# Patient Record
Sex: MALE | Race: WHITE | NOT HISPANIC OR LATINO | Employment: FULL TIME | URBAN - NONMETROPOLITAN AREA
[De-identification: names, ages, dates, MRNs, and addresses within clinical notes are randomized per-mention and may not be internally consistent; named-entity substitution may affect disease eponyms.]

---
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
## 2022-01-07 PROCEDURE — U0004 COV-19 TEST NON-CDC HGH THRU: HCPCS | Performed by: NURSE PRACTITIONER

## 2022-08-17 ENCOUNTER — TRANSCRIBE ORDERS (OUTPATIENT)
Dept: ULTRASOUND IMAGING | Facility: HOSPITAL | Age: 22
End: 2022-08-17

## 2022-08-17 ENCOUNTER — HOSPITAL ENCOUNTER (OUTPATIENT)
Dept: ULTRASOUND IMAGING | Facility: HOSPITAL | Age: 22
Discharge: HOME | End: 2022-08-17

## 2022-08-17 DIAGNOSIS — N50.819 PAIN IN TESTICLE, UNSPECIFIED LATERALITY: ICD-10-CM

## 2022-08-17 DIAGNOSIS — N50.819 PAIN IN TESTICLE, UNSPECIFIED LATERALITY: Primary | ICD-10-CM

## 2022-08-17 DIAGNOSIS — R10.9 ACUTE ABDOMINAL PAIN: ICD-10-CM

## 2022-08-17 PROCEDURE — 76882 US LMTD JT/FCL EVL NVASC XTR: CPT

## 2022-08-17 PROCEDURE — 76870 US EXAM SCROTUM: CPT

## 2022-11-09 ENCOUNTER — OFFICE VISIT (OUTPATIENT)
Dept: CARDIOLOGY | Facility: CLINIC | Age: 22
End: 2022-11-09

## 2022-11-09 ENCOUNTER — PATIENT ROUNDING (BHMG ONLY) (OUTPATIENT)
Dept: CARDIOLOGY | Facility: CLINIC | Age: 22
End: 2022-11-09

## 2022-11-09 VITALS
SYSTOLIC BLOOD PRESSURE: 142 MMHG | HEART RATE: 78 BPM | DIASTOLIC BLOOD PRESSURE: 96 MMHG | BODY MASS INDEX: 28.71 KG/M2 | OXYGEN SATURATION: 99 % | RESPIRATION RATE: 18 BRPM | WEIGHT: 212 LBS | HEIGHT: 72 IN

## 2022-11-09 DIAGNOSIS — R07.89 CHEST PAIN, ATYPICAL: Primary | ICD-10-CM

## 2022-11-09 PROCEDURE — 93000 ELECTROCARDIOGRAM COMPLETE: CPT | Performed by: INTERNAL MEDICINE

## 2022-11-09 PROCEDURE — 99203 OFFICE O/P NEW LOW 30 MIN: CPT | Performed by: INTERNAL MEDICINE

## 2022-11-09 RX ORDER — DOXYCYCLINE 100 MG/1
100 CAPSULE ORAL 2 TIMES DAILY
COMMUNITY
Start: 2022-08-18

## 2022-11-09 NOTE — PROGRESS NOTES
[facility] Cardiology welcome email and rounding message has been sent to patient via [employer].

## 2022-11-09 NOTE — PROGRESS NOTES
"     [facility] Cardiology OP Consult Note    Paulo Amador  [id number]  2022    Referred By: No ref. provider found    Chief Complaint: Abnormal ECG and chest pain    History of Present Illness:   Mr. Paulo Amador is a 22 y.o. male who presents to the Cardiology Clinic for evaluation of an abnormal ECG and chest pain.  The patient does not have any significant past medical history.  He does not have any significant past cardiac history, though saw cardiology as a teenager for evaluation of an abnormal ECG and reportedly underwent unremarkable outpatient cardiac monitoring at that time.  He presents the cardiology clinic today for evaluation of an abnormal ECG.  The patient reports that upon recent follow-up with an urgent care center, an ECG was obtained and was reportedly abnormal.  It was subsequently suggested he follow-up with cardiology.  Today, the patient reports rare episodes of chest pain, typically with a \"sharp\" sensation.  The episodes occur randomly, and have no association with exertion.  The episodes typically resolve quickly.  He denies any associated nausea, vomiting, or diaphoresis.  No significant exertional dyspnea.  No palpitations.  He denies any history of presyncopal or syncopal events.  No other specific complaints today.    Past Cardiac Testin. Last Coronary Angio: None  2. Prior Stress Testing: None  3. Last Echo: None  4. Prior Holter Monitor: Remote, records unavailable    Review of Systems:   Review of Systems   Constitutional: Negative for activity change, appetite change, chills, diaphoresis, fatigue, fever, unexpected weight gain and unexpected weight loss.   Eyes: Negative for blurred vision and double vision.   Respiratory: Negative for cough, chest tightness, shortness of breath and wheezing.    Cardiovascular: Positive for chest pain. Negative for palpitations and leg swelling.   Gastrointestinal: Negative for abdominal pain, anal bleeding, " "blood in stool and GERD.   Endocrine: Negative for cold intolerance and heat intolerance.   Genitourinary: Negative for hematuria.   Neurological: Negative for dizziness, syncope, weakness and light-headedness.   Hematological: Does not bruise/bleed easily.   Psychiatric/Behavioral: Negative for depressed mood and stress. The patient is not nervous/anxious.        Past Medical History:   Past Medical History:   Diagnosis Date   • Abnormal ECG        Past Surgical History:   Past Surgical History:   Procedure Laterality Date   • ADENOIDECTOMY     • TONSILLECTOMY         Family History:   Family History   Problem Relation Age of Onset   • Hypertension Mother    • No Known Problems Father    • Diabetes Maternal Grandmother    • Diabetes Maternal Grandfather        Social History:   Social History     Socioeconomic History   • Marital status: Single   Tobacco Use   • Smoking status: Never   • Smokeless tobacco: Never   Vaping Use   • Vaping Use: Never used   Substance and Sexual Activity   • Alcohol use: No   • Drug use: No   • Sexual activity: Defer       Medications:     Current Outpatient Medications:   •  doxycycline (MONODOX) 100 MG capsule, Take 1 capsule by mouth 2 (Two) Times a Day., Disp: , Rfl:     Allergies:   No Known Allergies    Physical Exam:  Vital Signs:   Vitals:    11/09/22 1348   BP: 142/96   BP Location: Left arm   Patient Position: Lying   Pulse: 78   Resp: 18   SpO2: 99%   Weight: 96.2 kg (212 lb)   Height: 182.9 cm (72\")       Physical Exam  Constitutional:       General: He is not in acute distress.     Appearance: Normal appearance. He is not diaphoretic.   HENT:      Head: Normocephalic and atraumatic.   Cardiovascular:      Rate and Rhythm: Normal rate and regular rhythm.      Heart sounds: No murmur heard.  Pulmonary:      Effort: Pulmonary effort is normal. No respiratory distress.      Breath sounds: Normal breath sounds. No stridor. No wheezing, rhonchi or rales.   Abdominal:      " General: Bowel sounds are normal. There is no distension.      Palpations: Abdomen is soft.      Tenderness: There is no abdominal tenderness. There is no guarding or rebound.   Musculoskeletal:         General: No swelling. Normal range of motion.      Cervical back: Neck supple. No tenderness.   Skin:     General: Skin is warm and dry.   Neurological:      General: No focal deficit present.      Mental Status: He is alert and oriented to person, place, and time.   Psychiatric:         Mood and Affect: Mood normal.         Behavior: Behavior normal.         Results Review:   I reviewed the patient's new clinical results.  I personally viewed and interpreted the patient's EKG/Telemetry data      ECG 12 Lead    Date/Time: 11/9/2022 5:02 PM  Performed by: Denis Nicole MD  Authorized by: Denis Nicole MD   Comparison: not compared with previous ECG   Rhythm: sinus rhythm  Rate: normal  QRS axis: normal  Other findings: non-specific ST-T wave changes    Clinical impression: abnormal EKG            Assessment / Plan:     1. Chest pain  -- No significant past cardiac history or cardiovascular risk factors  -- Episodes of chest pain are atypical to noncardiac in character, no association with exertion  -- ECG today shows NSR, nonspecific ST and T wave abnormality  -- While less likely, will obtain GXT to further rule out ischemia contributing to current symptoms  -- Will obtain baseline echocardiogram to rule out underlying structural valvular abnormalities  -- Follow-up as needed, pending results of cardiac testing        Follow Up:   Return if symptoms worsen or fail to improve.      Thank you for allowing me to participate in the care of your patient. Please to not hesitate to contact me with additional questions or concerns.     TERRENCE Nicole MD  Interventional Cardiology   11/09/2022  13:50 EST

## 2022-12-22 ENCOUNTER — TELEPHONE (OUTPATIENT)
Dept: CARDIOLOGY | Facility: CLINIC | Age: 22
End: 2022-12-22

## 2022-12-22 NOTE — TELEPHONE ENCOUNTER
"Patient recently had an echo and stress test. 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 Can you review patients testing and advise?  "